# Patient Record
Sex: MALE | Race: WHITE | Employment: OTHER | URBAN - METROPOLITAN AREA
[De-identification: names, ages, dates, MRNs, and addresses within clinical notes are randomized per-mention and may not be internally consistent; named-entity substitution may affect disease eponyms.]

---

## 2017-04-14 ENCOUNTER — APPOINTMENT (OUTPATIENT)
Dept: CT IMAGING | Age: 72
End: 2017-04-14
Attending: EMERGENCY MEDICINE
Payer: COMMERCIAL

## 2017-04-14 ENCOUNTER — HOSPITAL ENCOUNTER (EMERGENCY)
Age: 72
Discharge: HOME | End: 2017-04-14
Attending: EMERGENCY MEDICINE
Payer: COMMERCIAL

## 2017-04-14 ENCOUNTER — APPOINTMENT (OUTPATIENT)
Dept: GENERAL RADIOLOGY | Age: 72
End: 2017-04-14
Attending: EMERGENCY MEDICINE
Payer: COMMERCIAL

## 2017-04-14 VITALS
BODY MASS INDEX: 25.57 KG/M2 | HEIGHT: 69 IN | RESPIRATION RATE: 18 BRPM | TEMPERATURE: 97.4 F | SYSTOLIC BLOOD PRESSURE: 138 MMHG | DIASTOLIC BLOOD PRESSURE: 88 MMHG | WEIGHT: 172.62 LBS | OXYGEN SATURATION: 95 % | HEART RATE: 64 BPM

## 2017-04-14 DIAGNOSIS — N32.89 BLADDER WALL THICKENING: ICD-10-CM

## 2017-04-14 DIAGNOSIS — R31.9 HEMATURIA: Primary | ICD-10-CM

## 2017-04-14 DIAGNOSIS — E87.6 HYPOKALEMIA: ICD-10-CM

## 2017-04-14 DIAGNOSIS — N30.01 ACUTE CYSTITIS WITH HEMATURIA: ICD-10-CM

## 2017-04-14 DIAGNOSIS — J06.9 ACUTE URI: ICD-10-CM

## 2017-04-14 LAB
ALBUMIN SERPL BCP-MCNC: 3.9 G/DL (ref 3.5–5)
ALBUMIN/GLOB SERPL: 1 {RATIO} (ref 1.1–2.2)
ALP SERPL-CCNC: 66 U/L (ref 45–117)
ALT SERPL-CCNC: 19 U/L (ref 12–78)
ANION GAP BLD CALC-SCNC: 12 MMOL/L (ref 5–15)
APPEARANCE UR: ABNORMAL
AST SERPL W P-5'-P-CCNC: 9 U/L (ref 15–37)
BACTERIA URNS QL MICRO: NEGATIVE /HPF
BASOPHILS # BLD AUTO: 0.1 K/UL (ref 0–0.1)
BASOPHILS # BLD: 0 % (ref 0–1)
BILIRUB SERPL-MCNC: 2.5 MG/DL (ref 0.2–1)
BILIRUB UR QL: NEGATIVE
BUN SERPL-MCNC: 15 MG/DL (ref 6–20)
BUN/CREAT SERPL: 14 (ref 12–20)
CALCIUM SERPL-MCNC: 9.1 MG/DL (ref 8.5–10.1)
CHLORIDE SERPL-SCNC: 97 MMOL/L (ref 97–108)
CO2 SERPL-SCNC: 25 MMOL/L (ref 21–32)
COLOR UR: ABNORMAL
CREAT SERPL-MCNC: 1.06 MG/DL (ref 0.7–1.3)
EOSINOPHIL # BLD: 0.1 K/UL (ref 0–0.4)
EOSINOPHIL NFR BLD: 1 % (ref 0–7)
EPITH CASTS URNS QL MICRO: ABNORMAL /LPF
ERYTHROCYTE [DISTWIDTH] IN BLOOD BY AUTOMATED COUNT: 13.2 % (ref 11.5–14.5)
FLUAV AG NPH QL IA: NEGATIVE
FLUBV AG NOSE QL IA: NEGATIVE
GLOBULIN SER CALC-MCNC: 3.9 G/DL (ref 2–4)
GLUCOSE SERPL-MCNC: 197 MG/DL (ref 65–100)
GLUCOSE UR STRIP.AUTO-MCNC: NEGATIVE MG/DL
HCT VFR BLD AUTO: 37.5 % (ref 36.6–50.3)
HGB BLD-MCNC: 13.6 G/DL (ref 12.1–17)
HGB UR QL STRIP: ABNORMAL
KETONES UR QL STRIP.AUTO: NEGATIVE MG/DL
LACTATE SERPL-SCNC: 1.1 MMOL/L (ref 0.4–2)
LEUKOCYTE ESTERASE UR QL STRIP.AUTO: NEGATIVE
LYMPHOCYTES # BLD AUTO: 11 % (ref 12–49)
LYMPHOCYTES # BLD: 1.8 K/UL (ref 0.8–3.5)
MCH RBC QN AUTO: 32.3 PG (ref 26–34)
MCHC RBC AUTO-ENTMCNC: 36.3 G/DL (ref 30–36.5)
MCV RBC AUTO: 89.1 FL (ref 80–99)
MONOCYTES # BLD: 1.7 K/UL (ref 0–1)
MONOCYTES NFR BLD AUTO: 11 % (ref 5–13)
NEUTS SEG # BLD: 12.3 K/UL (ref 1.8–8)
NEUTS SEG NFR BLD AUTO: 77 % (ref 32–75)
NITRITE UR QL STRIP.AUTO: NEGATIVE
PH UR STRIP: 7 [PH] (ref 5–8)
PLATELET # BLD AUTO: 239 K/UL (ref 150–400)
POTASSIUM SERPL-SCNC: 3.1 MMOL/L (ref 3.5–5.1)
PROT SERPL-MCNC: 7.8 G/DL (ref 6.4–8.2)
PROT UR STRIP-MCNC: >300 MG/DL
RBC # BLD AUTO: 4.21 M/UL (ref 4.1–5.7)
RBC #/AREA URNS HPF: >100 /HPF (ref 0–5)
SODIUM SERPL-SCNC: 134 MMOL/L (ref 136–145)
SP GR UR REFRACTOMETRY: 1.02 (ref 1–1.03)
UA: UC IF INDICATED,UAUC: ABNORMAL
UROBILINOGEN UR QL STRIP.AUTO: 1 EU/DL (ref 0.2–1)
WBC # BLD AUTO: 16 K/UL (ref 4.1–11.1)
WBC URNS QL MICRO: ABNORMAL /HPF (ref 0–4)

## 2017-04-14 PROCEDURE — 74011636320 HC RX REV CODE- 636/320: Performed by: EMERGENCY MEDICINE

## 2017-04-14 PROCEDURE — 87040 BLOOD CULTURE FOR BACTERIA: CPT | Performed by: EMERGENCY MEDICINE

## 2017-04-14 PROCEDURE — 87086 URINE CULTURE/COLONY COUNT: CPT | Performed by: EMERGENCY MEDICINE

## 2017-04-14 PROCEDURE — 81001 URINALYSIS AUTO W/SCOPE: CPT | Performed by: EMERGENCY MEDICINE

## 2017-04-14 PROCEDURE — 87077 CULTURE AEROBIC IDENTIFY: CPT | Performed by: EMERGENCY MEDICINE

## 2017-04-14 PROCEDURE — 74177 CT ABD & PELVIS W/CONTRAST: CPT

## 2017-04-14 PROCEDURE — 99284 EMERGENCY DEPT VISIT MOD MDM: CPT

## 2017-04-14 PROCEDURE — 74011250636 HC RX REV CODE- 250/636: Performed by: EMERGENCY MEDICINE

## 2017-04-14 PROCEDURE — 74011636320 HC RX REV CODE- 636/320

## 2017-04-14 PROCEDURE — 83605 ASSAY OF LACTIC ACID: CPT | Performed by: EMERGENCY MEDICINE

## 2017-04-14 PROCEDURE — 74176 CT ABD & PELVIS W/O CONTRAST: CPT

## 2017-04-14 PROCEDURE — 87186 SC STD MICRODIL/AGAR DIL: CPT | Performed by: EMERGENCY MEDICINE

## 2017-04-14 PROCEDURE — 85025 COMPLETE CBC W/AUTO DIFF WBC: CPT | Performed by: EMERGENCY MEDICINE

## 2017-04-14 PROCEDURE — 80053 COMPREHEN METABOLIC PANEL: CPT | Performed by: EMERGENCY MEDICINE

## 2017-04-14 PROCEDURE — 87804 INFLUENZA ASSAY W/OPTIC: CPT | Performed by: EMERGENCY MEDICINE

## 2017-04-14 PROCEDURE — 36415 COLL VENOUS BLD VENIPUNCTURE: CPT | Performed by: EMERGENCY MEDICINE

## 2017-04-14 PROCEDURE — 71010 XR CHEST PORT: CPT

## 2017-04-14 RX ORDER — LISINOPRIL AND HYDROCHLOROTHIAZIDE 20; 25 MG/1; MG/1
1 TABLET ORAL DAILY
COMMUNITY

## 2017-04-14 RX ORDER — NITROFURANTOIN 25; 75 MG/1; MG/1
100 CAPSULE ORAL 2 TIMES DAILY
Qty: 6 CAP | Refills: 0 | Status: SHIPPED | OUTPATIENT
Start: 2017-04-14 | End: 2017-04-17

## 2017-04-14 RX ORDER — ASPIRIN 81 MG/1
81 TABLET ORAL DAILY
COMMUNITY

## 2017-04-14 RX ORDER — ALBUTEROL SULFATE 90 UG/1
AEROSOL, METERED RESPIRATORY (INHALATION)
COMMUNITY

## 2017-04-14 RX ORDER — COLCHICINE 0.6 MG/1
0.6 TABLET ORAL DAILY
COMMUNITY

## 2017-04-14 RX ORDER — GLIMEPIRIDE 1 MG/1
1 TABLET ORAL
COMMUNITY

## 2017-04-14 RX ORDER — METFORMIN HYDROCHLORIDE 500 MG/1
TABLET, FILM COATED, EXTENDED RELEASE ORAL
COMMUNITY

## 2017-04-14 RX ORDER — SODIUM CHLORIDE 0.9 % (FLUSH) 0.9 %
10 SYRINGE (ML) INJECTION
Status: COMPLETED | OUTPATIENT
Start: 2017-04-14 | End: 2017-04-14

## 2017-04-14 RX ORDER — SODIUM CHLORIDE 9 MG/ML
50 INJECTION, SOLUTION INTRAVENOUS
Status: COMPLETED | OUTPATIENT
Start: 2017-04-14 | End: 2017-04-14

## 2017-04-14 RX ORDER — POTASSIUM CHLORIDE 750 MG/1
10 TABLET, FILM COATED, EXTENDED RELEASE ORAL DAILY
Qty: 10 TAB | Refills: 0 | Status: SHIPPED | OUTPATIENT
Start: 2017-04-14

## 2017-04-14 RX ADMIN — IOPAMIDOL 100 ML: 755 INJECTION, SOLUTION INTRAVENOUS at 16:27

## 2017-04-14 RX ADMIN — Medication 10 ML: at 16:27

## 2017-04-14 RX ADMIN — DIATRIZOATE MEGLUMINE AND DIATRIZOATE SODIUM 30 ML: 660; 100 LIQUID ORAL; RECTAL at 14:58

## 2017-04-14 RX ADMIN — IOPAMIDOL: 755 INJECTION, SOLUTION INTRAVENOUS at 11:00

## 2017-04-14 RX ADMIN — SODIUM CHLORIDE 50 ML/HR: 900 INJECTION, SOLUTION INTRAVENOUS at 16:27

## 2017-04-14 NOTE — DISCHARGE INSTRUCTIONS
Blood in the Urine: Care Instructions  Your Care Instructions  Blood in the urine, or hematuria, may make the urine look red, brown, or pink. There may be blood every time you urinate or just from time to time. You cannot always see blood in the urine, but it will show up in a urine test.  Blood in the urine may be serious. It should always be checked by a doctor. Your doctor may recommend more tests, including an X-ray, a CT scan, or a cystoscopy (which lets a doctor look inside the urethra and bladder). Blood in the urine can be a sign of another problem. Common causes are bladder infections and kidney stones. An injury to your groin or your genital area can also cause bleeding in the urinary tract. Very hard exercise--such as running a marathon--can cause blood in the urine. Blood in the urine can also be a sign of kidney disease or cancer in the bladder or kidney. Many cases of blood in the urine are caused by a harmless condition that runs in families. This is called benign familial hematuria. It does not need any treatment. Sometimes your urine may look red or brown even though it does not contain blood. For example, not getting enough fluids (dehydration), taking certain medicines, or having a liver problem can change the color of your urine. Eating foods such as beets, rhubarb, or blackberries or foods with red food coloring can make your urine look red or pink. Follow-up care is a key part of your treatment and safety. Be sure to make and go to all appointments, and call your doctor if you are having problems. It's also a good idea to know your test results and keep a list of the medicines you take. When should you call for help? Call your doctor now or seek immediate medical care if:  · You have symptoms of a urinary infection. For example:  ¨ You have pus in your urine. ¨ You have pain in your back just below your rib cage. This is called flank pain.   ¨ You have a fever, chills, or body aches.  ¨ It hurts to urinate. ¨ You have groin or belly pain. · You have more blood in your urine. Watch closely for changes in your health, and be sure to contact your doctor if:  · You have new urination problems. · You do not get better as expected. Where can you learn more? Go to http://viviane-ahsan.info/. Enter C525 in the search box to learn more about \"Blood in the Urine: Care Instructions. \"  Current as of: August 12, 2016  Content Version: 11.2  © 9768-0870 [employer]. Care instructions adapted under license by [employer] (which disclaims liability or warranty for this information). If you have questions about a medical condition or this instruction, always ask your healthcare professional. [address] any warranty or liability for your use of this information. Hypokalemia: Care Instructions  Your Care Instructions  Hypokalemia (say \"eb-yp-vbb-SOL-hoda-uh\") is a low level of potassium. The heart, muscles, kidneys, and nervous system all need potassium to work well. This problem has many different causes. Kidney problems, diet, and medicines like diuretics and laxatives can cause it. So can vomiting or diarrhea. In some cases, cancer is the cause. Your doctor may do tests to find the cause of your low potassium levels. You may need medicines to bring your potassium levels back to normal. You may also need regular blood tests to check your potassium. If you have very low potassium, you may need intravenous (IV) medicines. You also may need tests to check the electrical activity of your heart. Heart problems caused by low potassium levels can be very serious. Follow-up care is a key part of your treatment and safety. Be sure to make and go to all appointments, and call your doctor if you are having problems. It's also a good idea to know your test results and keep a list of the medicines you take.   How can you care for yourself at home? · If your doctor recommends it, eat foods that have a lot of potassium. These include fresh fruits, juices, and vegetables. They also include nuts, beans, and milk. · Be safe with medicines. If your doctor prescribes medicines or potassium supplements, take them exactly as directed. Call your doctor if you have any problems with your medicines. · Get your potassium levels tested as often as your doctor tells you. When should you call for help? Call 911 anytime you think you may need emergency care. For example, call if:  · You feel like your heart is missing beats. Heart problems caused by low potassium can cause death. · You passed out (lost consciousness). · You have a seizure. Call your doctor now or seek immediate medical care if:  · You feel weak or unusually tired. · You have severe arm or leg cramps. · You have tingling or numbness. · You feel sick to your stomach, or you vomit. · You have belly cramps. · You feel bloated or constipated. · You have to urinate a lot. · You feel very thirsty most of the time. · You are dizzy or lightheaded, or you feel like you may faint. · You feel depressed, or you lose touch with reality. Watch closely for changes in your health, and be sure to contact your doctor if:  · You do not get better as expected. Where can you learn more? Go to http://viviane-ahsan.info/. Enter G358 in the search box to learn more about \"Hypokalemia: Care Instructions. \"  Current as of: July 28, 2016  Content Version: 11.2  © 7467-7230 [employer]. Care instructions adapted under license by [employer] (which disclaims liability or warranty for this information). If you have questions about a medical condition or this instruction, always ask your healthcare professional. [address] any warranty or liability for your use of this information.          Upper Respiratory Infection (Cold): Care Instructions  Your Care Instructions    An upper respiratory infection, or URI, is an infection of the nose, sinuses, or throat. URIs are spread by coughs, sneezes, and direct contact. The common cold is the most frequent kind of URI. The flu and sinus infections are other kinds of URIs. Almost all URIs are caused by viruses. Antibiotics won't cure them. But you can treat most infections with home care. This may include drinking lots of fluids and taking over-the-counter pain medicine. You will probably feel better in 4 to 10 days. The doctor has checked you carefully, but problems can develop later. If you notice any problems or new symptoms, get medical treatment right away. Follow-up care is a key part of your treatment and safety. Be sure to make and go to all appointments, and call your doctor if you are having problems. It's also a good idea to know your test results and keep a list of the medicines you take. How can you care for yourself at home? · To prevent dehydration, drink plenty of fluids, enough so that your urine is light yellow or clear like water. Choose water and other caffeine-free clear liquids until you feel better. If you have kidney, heart, or liver disease and have to limit fluids, talk with your doctor before you increase the amount of fluids you drink. · Take an over-the-counter pain medicine, such as acetaminophen (Tylenol), ibuprofen (Advil, Motrin), or naproxen (Aleve). Read and follow all instructions on the label. · Before you use cough and cold medicines, check the label. These medicines may not be safe for young children or for people with certain health problems. · Be careful when taking over-the-counter cold or flu medicines and Tylenol at the same time. Many of these medicines have acetaminophen, which is Tylenol. Read the labels to make sure that you are not taking more than the recommended dose. Too much acetaminophen (Tylenol) can be harmful.   · Get plenty of rest.  · Do not smoke or allow others to smoke around you. If you need help quitting, talk to your doctor about stop-smoking programs and medicines. These can increase your chances of quitting for good. When should you call for help? Call 911 anytime you think you may need emergency care. For example, call if:  · You have severe trouble breathing. Call your doctor now or seek immediate medical care if:  · You seem to be getting much sicker. · You have new or worse trouble breathing. · You have a new or higher fever. · You have a new rash. Watch closely for changes in your health, and be sure to contact your doctor if:  · You have a new symptom, such as a sore throat, an earache, or sinus pain. · You cough more deeply or more often, especially if you notice more mucus or a change in the color of your mucus. · You do not get better as expected. Where can you learn more? Go to http://viviane-ahsan.info/. Enter T606 in the search box to learn more about \"Upper Respiratory Infection (Cold): Care Instructions. \"  Current as of: June 30, 2016  Content Version: 11.2  © 1455-3992 [employer], [employer]. Care instructions adapted under license by [employer] (which disclaims liability or warranty for this information). If you have questions about a medical condition or this instruction, always ask your healthcare professional. [address] any warranty or liability for your use of this information.

## 2017-04-14 NOTE — ED NOTES
Dr. Tomy Barroso at bedside to provide discharge paperwork. Vital signs stable. Pt in no apparent distress at this time. Mental status at baseline. Ambulatory to waiting room with steady gate, discharge paperwork in hand. Accompanied by wife. Dr. Tomy Barroso reviewed discharge instructions with the patient and spouse. The patient and spouse verbalized understanding.

## 2017-04-14 NOTE — ED PROVIDER NOTES
HPI Comments: Evelina Heredia is a 73 y.o. male with hx significant for prostate cancer, BPH, HTN, asthma, and DM who presents ambulatory to [facility] ED with cc of sudden onset of hematuria and dysuria since this morning. Pt also c/o fever of 101.8F and chills last night. Pt also has had a productive cough with \"white\" sputum which he attributes to his seasonal allergies. Pt reports he has prostate cancer which is \"slowly\" progressing. His urologist has elected to just monitor his cancer. Pt reports taking aspirin 81mg daily, but no other anticoagulants. He has not had an influenza vaccine this year. Pt denies nausea, vomiting, abdominal pain, CP, or SOB. Urologist: Neomia Lefort, MD    Social Hx: +former tobacco, +EtOH (occ.), -illicit drug usage    There are no other complaints, changes or physical findings at this time. The history is provided by the patient. Past Medical History:   Diagnosis Date    Asthma     Cancer ([facility] [address]     prostate    Diabetes ([facility] [address]     Hypertension     Ill-defined condition     gout, bph       Past Surgical History:   Procedure Laterality Date    HX APPENDECTOMY           History reviewed. No pertinent family history. Social History     Social History    Marital status:      Spouse name: N/A    Number of children: N/A    Years of education: N/A     Occupational History    Not on file. Social History Main Topics    Smoking status: Former Smoker    Smokeless tobacco: Not on file    Alcohol use Yes      Comment: occassional    Drug use: No    Sexual activity: Not on file     Other Topics Concern    Not on file     Social History Narrative    No narrative on file         ALLERGIES: Buckwheat and Penicillin g    Review of Systems   Constitutional: Positive for chills and fever. HENT: Negative for congestion. Eyes: Negative. Respiratory: Positive for cough. Negative for shortness of breath. Cardiovascular: Negative for chest pain. Gastrointestinal: Negative for abdominal pain, nausea and vomiting. Endocrine: Negative for heat intolerance. Genitourinary: Positive for dysuria and hematuria. Musculoskeletal: Negative for back pain. Skin: Negative for rash. Allergic/Immunologic: Negative for immunocompromised state. Neurological: Negative for dizziness. Hematological: Does not bruise/bleed easily. Psychiatric/Behavioral: Negative. All other systems reviewed and are negative. Patient Vitals for the past 12 hrs:   Temp Pulse Resp BP SpO2   04/14/17 1530 - 62 - (!) 149/92 97 %   04/14/17 1515 - - - 149/90 96 %   04/14/17 1500 - - - 151/89 97 %   04/14/17 1430 - - - (!) 138/91 95 %   04/14/17 1420 - - - 146/88 -   04/14/17 1345 - - - 136/89 96 %   04/14/17 1334 - - - (!) 137/94 97 %   04/14/17 1330 - - - 139/87 96 %   04/14/17 1315 - - - (!) 150/96 97 %   04/14/17 1300 - - - (!) 147/94 97 %   04/14/17 1244 97.4 °F (36.3 °C) (!) 58 16 (!) 153/93 97 %         Physical Exam   Constitutional: He is oriented to person, place, and time. He appears well-developed and well-nourished. NAD   HENT:   Head: Normocephalic and atraumatic. Eyes: EOM are normal.   Neck: Normal range of motion. Neck supple. Cardiovascular: Normal rate, regular rhythm and normal heart sounds. Pulmonary/Chest: Effort normal and breath sounds normal.   Abdominal: Soft. Bowel sounds are normal.   Musculoskeletal: He exhibits no edema. Neurological: He is alert and oriented to person, place, and time. Coordination normal.   Skin: Skin is warm and dry. Psychiatric: He has a normal mood and affect. Nursing note and vitals reviewed.        MDM  Number of Diagnoses or Management Options  Acute cystitis with hematuria:   Acute URI:   Bladder wall thickening:   Hematuria:   Hypokalemia:   Diagnosis management comments: DDx: UTI, hematuria, coagulopathy, influenza, bronchitis, pneumonia       Amount and/or Complexity of Data Reviewed  Clinical lab tests: ordered and reviewed  Tests in the radiology section of CPT®: ordered and reviewed  Review and summarize past medical records: yes    Patient Progress  Patient progress: stable    ED Course       Procedures    PROGRESS NOTES:  3:11 PM  Radiologist requested CT abd/pelvis with contrast due to abnormalities seen in pt's bladder. Written by Jania Kinney, ED Scribe, as dictated by Mike Sandy MD.    4:51 PM  Pt reports that since he has been drinking the oral contrast, he has noticed that his urine is clearing up. Discussed with pt importance of f/u with Dr. Tabatha Ceja. Written by Jania Kinney, ED Scribe, as dictated by Mike Sandy MD.      LABORATORY TESTS:  Recent Results (from the past 12 hour(s))   CBC WITH AUTOMATED DIFF    Collection Time: 04/14/17  1:04 PM   Result Value Ref Range    WBC 16.0 (H) 4.1 - 11.1 K/uL    RBC 4.21 4.10 - 5.70 M/uL    HGB 13.6 12.1 - 17.0 g/dL    HCT 37.5 36.6 - 50.3 %    MCV 89.1 80.0 - 99.0 FL    MCH 32.3 26.0 - 34.0 PG    MCHC 36.3 30.0 - 36.5 g/dL    RDW 13.2 11.5 - 14.5 %    PLATELET 511 097 - 580 K/uL    NEUTROPHILS 77 (H) 32 - 75 %    LYMPHOCYTES 11 (L) 12 - 49 %    MONOCYTES 11 5 - 13 %    EOSINOPHILS 1 0 - 7 %    BASOPHILS 0 0 - 1 %    ABS. NEUTROPHILS 12.3 (H) 1.8 - 8.0 K/UL    ABS. LYMPHOCYTES 1.8 0.8 - 3.5 K/UL    ABS. MONOCYTES 1.7 (H) 0.0 - 1.0 K/UL    ABS. EOSINOPHILS 0.1 0.0 - 0.4 K/UL    ABS.  BASOPHILS 0.1 0.0 - 0.1 K/UL   METABOLIC PANEL, COMPREHENSIVE    Collection Time: 04/14/17  1:04 PM   Result Value Ref Range    Sodium 134 (L) 136 - 145 mmol/L    Potassium 3.1 (L) 3.5 - 5.1 mmol/L    Chloride 97 97 - 108 mmol/L    CO2 25 21 - 32 mmol/L    Anion gap 12 5 - 15 mmol/L    Glucose 197 (H) 65 - 100 mg/dL    BUN 15 6 - 20 MG/DL    Creatinine 1.06 0.70 - 1.30 MG/DL    BUN/Creatinine ratio 14 12 - 20      GFR est AA >60 >60 ml/min/1.73m2    GFR est non-AA >60 >60 ml/min/1.73m2    Calcium 9.1 8.5 - 10.1 MG/DL    Bilirubin, total 2.5 (H) 0.2 - 1.0 MG/DL ALT (SGPT) 19 12 - 78 U/L    AST (SGOT) 9 (L) 15 - 37 U/L    Alk. phosphatase 66 45 - 117 U/L    Protein, total 7.8 6.4 - 8.2 g/dL    Albumin 3.9 3.5 - 5.0 g/dL    Globulin 3.9 2.0 - 4.0 g/dL    A-G Ratio 1.0 (L) 1.1 - 2.2     URINALYSIS W/ REFLEX CULTURE    Collection Time: 04/14/17  1:04 PM   Result Value Ref Range    Color RED      Appearance OPAQUE (A) CLEAR      Specific gravity 1.025 1.003 - 1.030      pH (UA) 7.0 5.0 - 8.0      Protein >300 (A) NEG mg/dL    Glucose NEGATIVE  NEG mg/dL    Ketone NEGATIVE  NEG mg/dL    Bilirubin NEGATIVE  NEG      Blood LARGE (A) NEG      Urobilinogen 1.0 0.2 - 1.0 EU/dL    Nitrites NEGATIVE  NEG      Leukocyte Esterase NEGATIVE  NEG      WBC 5-10 0 - 4 /hpf    RBC >100 (H) 0 - 5 /hpf    Epithelial cells FEW FEW /lpf    Bacteria NEGATIVE  NEG /hpf    UA:UC IF INDICATED URINE CULTURE ORDERED (A) CNI     LACTIC ACID, PLASMA    Collection Time: 04/14/17  1:39 PM   Result Value Ref Range    Lactic acid 1.1 0.4 - 2.0 MMOL/L   INFLUENZA A & B AG (RAPID TEST)    Collection Time: 04/14/17  1:39 PM   Result Value Ref Range    Influenza A Antigen NEGATIVE  NEG      Influenza B Antigen NEGATIVE  NEG         IMAGING RESULTS:  CT Results  (Last 48 hours)               04/14/17 1628  CT ABD PELV W CONT Final result    Impression:  IMPRESSION:    1. Uniform mural thickening in the bladder, most pronounced posteriorly, which   may be related to chronic outlet obstruction or possibly be treatment related. Recommend clinical correlation. 2. Prostatomegaly. 3. Contracted gallbladder with a rim calcified lesion in the fundus, possibly   representing a single rim calcified gallstone. 4. Rim calcified lesion projecting off the medial, lower pole of the right   kidney               Narrative:  EXAM:  CT ABDOMEN PELVIS WITH CONTRAST   INDICATION:  hematuria - CT with contrast recommended by radiologist   COMPARISON: CT of the abdomen and pelvis, earlier same day.    .   TECHNIQUE:    Multislice helical CT was performed from the diaphragm to the symphysis pubis   with oral and intravenous contrast administration. Contiguous 5 mm axial images   were reconstructed and lung and soft tissue windows were generated. Coronal and   sagittal reformations were generated. CT dose reduction was achieved through use of a standardized protocol tailored   for this examination and automatic exposure control for dose modulation. Wolm Cordell FINDINGS:   INCIDENTALLY IMAGED CHEST:   Heart/vessels: Within normal limits. Lungs/Pleura: Emphysema. .   ABDOMEN:   Liver: Within normal limits. Gallbladder/Biliary: Contracted gallbladder with a ring calcified lesion in the   fundus measuring approximately 1.7 cm in diameter. Spleen: Within normal limits. Pancreas: Within normal limits. Adrenals: Within normal limits. Kidneys: There is a rim calcified lesion in the lower, medial pole of the right   kidney which appears unchanged, measuring approximately 1.2 cm in diameter. Peritoneum/Mesenteries: Within normal limits. Extraperitoneum: Within normal limits. Gastrointestinal tract: Moderate to large stool burden. .   Vascular: Calcifications in the aorta. Accessory right renal vein. Wolm Cordell PELVIS:   Extraperitoneum: Within normal limits. Ureters: Within normal limits. Bladder: Bilateral ureteral jets. Minimal uniform mural thickening   Reproductive System: Prostatomegaly. .   MSK:    Nonfocal degenerative changes in the spine. Small, fat-containing umbilical   hernia. Sclerosis in the sacrum, likely representing a bone island. Other, small   sclerotic foci in the pelvis and lumbar spine suggesting bone islands. Humble Cordell 04/14/17 1412  CT ABD PELV WO CONT Final result    Impression:  IMPRESSION:       1. Bladder wall thickness and mild irregularity.  While this may simply represent   trabeculation related to prostatic enlargement, further evaluation with   intravenous contrast or cystoscopy or even ultrasonography would be prudent in   view of the patient's clinical history of hematuria. .   2. Coarse calcification in the right renal parenchyma. No CT evidence for   urolithiasis or urinary obstruction. 3. Coarse calcification in the liver or less likely gallbladder. Correlate for   possible cholecystectomy. 4. Sclerotic lesions in the pelvis and sacrum. These may represent bone islands,   but there are no prior studies for comparison. Correlate with any known history   of metastatic disease and compared to prior imaging. Radionuclide bone scan   would be required for further evaluation. Narrative:  INDICATION: hematuria/fever  . No pain but history of hypertension and prostate   cancer. Difficulty urinating. History of appendectomy in the past.       COMPARISON: None       TECHNIQUE:    Thin axial images were obtained through the abdomen and pelvis. Coronal and   sagittal reconstructions were generated. Oral contrast was not administered. CT   dose reduction was achieved through use of a standardized protocol tailored for   this examination and automatic exposure control for dose modulation. The absence of intravenous contrast material reduces the sensitivity for   evaluation of the solid parenchymal organs of the abdomen. FINDINGS:    LUNG BASES: Clear. INCIDENTALLY IMAGED HEART AND MEDIASTINUM: Unremarkable. LIVER: No mass or biliary dilatation. See below. GALLBLADDER: Not definitely identified, but possibly heavily calcified. Alternatively this may represent a coarse calcification in the liver parenchyma. SPLEEN: No mass. PANCREAS: No mass or ductal dilatation. ADRENALS: Unremarkable. KIDNEYS/URETERS: No mass, calculus, or hydronephrosis. Coarse calcification in   the medial right lower pole cortex, significance uncertain. No intrarenal   calculi. STOMACH: Unremarkable. SMALL BOWEL: No dilatation or wall thickening. COLON: No dilatation or wall thickening.    APPENDIX: Not seen.   PERITONEUM: No ascites or pneumoperitoneum. RETROPERITONEUM: No lymphadenopathy or aortic aneurysm. The aorta and proximal   common iliac arteries are ectatic. REPRODUCTIVE ORGANS: The prostate gland is enlarged, 4.4 x 5.2 cm, impressing   into the bladder base. URINARY BLADDER: Prominence of bladder wall thickness may be related to   trabeculation, with slight irregularity in the bladder base in the region of the   hemitrigones. Although a definite bladder wall mass is not confirmed, further   confirmation would require intravenous contrast.   BONES: No destructive bone lesion. However, there is a sclerotic slightly   irregular 1.3 x 1.4 cm lesion in the distal sacrum, and several small sclerotic   lesions 5 mm or less in the remainder of the pelvis and one in the L5 vertebral   body. .   ADDITIONAL COMMENTS: Vascular calcification is heavy. CXR Results  (Last 48 hours)               04/14/17 1349  XR CHEST PORT Final result    Impression:  IMPRESSION:   No acute cardiopulmonary disease radiographically. .  . Narrative:  INDICATION:  cough/fever        EXAM: Chest single view. COMPARISON: 1/15/2010. FINDINGS: A single frontal view of the chest at 1344 hours shows clear lungs. The heart, mediastinum and pulmonary vasculature are stable . The bony thorax   is unremarkable for age. Valeria Valera MEDICATIONS GIVEN:  Medications   diatrizoate meglumine-d.sodium (MD-GASTROVIEW,GASTROGRAFIN) 66-10 % contrast solution 30 mL (30 mL Oral Given 4/14/17 1458)   0.9% sodium chloride infusion (50 mL/hr IntraVENous New Bag 4/14/17 1627)   iopamidol (ISOVUE-370) 76 % injection 100 mL (100 mL IntraVENous Given 4/14/17 1627)   sodium chloride (NS) flush 10 mL (10 mL IntraVENous Given 4/14/17 1627)   iopamidol (ISOVUE-370) 76 % injection (  Given 4/14/17 1100)       IMPRESSION:  1. Hematuria    2. Bladder wall thickening    3. Acute cystitis with hematuria    4.  Acute URI 5. Hypokalemia        PLAN:  Current Discharge Medication List      START taking these medications    Details   potassium chloride SR (KLOR-CON 10) 10 mEq tablet Take 1 Tab by mouth daily. Qty: 10 Tab, Refills: 0      nitrofurantoin, macrocrystal-monohydrate, (MACROBID) 100 mg capsule Take 1 Cap by mouth two (2) times a day for 3 days. Qty: 6 Cap, Refills: 0           Follow-up Information     Follow up With Details Comments Contact Info    Karlene Snellen., MD Call in 1 day  [address]  [phone]      \[facility]\"" EMERGENCY DEPT  If symptoms worsen [address] [facility] [place]note 2[facility] [address] [facility]         Return to ED if worse     DISCHARGE NOTE:  4:55 PM  Pt has been reexamined. Pt has no new complaints, changes, or physical findings. Care plan outlined and precautions discussed. All available results reviewed with pt. All medications reviewed with pt. All of pts questions and concerns addressed. Pt agrees to f/u as instructed and agrees to return to ED upon further deterioration. Pt is ready to go home. ATTESTATION:  This note is prepared by Danny Elmore, acting as Scribe for Janine Ewing MD.    Janine Ewing MD and personally reviewed by me in its entirety. I confirm that the note above accurately reflects all work, treatment, procedures, and medical decision making performed by me.

## 2017-04-16 LAB
BACTERIA SPEC CULT: ABNORMAL
CC UR VC: ABNORMAL
SERVICE CMNT-IMP: ABNORMAL

## 2017-04-19 LAB
BACTERIA SPEC CULT: NORMAL
SERVICE CMNT-IMP: NORMAL

## 2017-05-09 ENCOUNTER — HOSPITAL ENCOUNTER (OUTPATIENT)
Dept: NUCLEAR MEDICINE | Age: 72
Discharge: HOME | End: 2017-05-09
Attending: UROLOGY
Payer: COMMERCIAL

## 2017-05-09 DIAGNOSIS — C61 PROSTATE CA (HCC): ICD-10-CM

## 2017-05-09 PROCEDURE — 78306 BONE IMAGING WHOLE BODY: CPT

## 2023-05-20 NOTE — ED NOTES
Received patient to exam room. Pt in bed in position of comfort and call bell within reach. Pt started with marge blood in his urine this am.  Pt denies any pain but patient has hx of bph and prostates ca (slow growing). Pt notes that it is very difficult to urinate. DISPLAY PLAN FREE TEXT